# Patient Record
Sex: FEMALE | Race: ASIAN | NOT HISPANIC OR LATINO | URBAN - METROPOLITAN AREA
[De-identification: names, ages, dates, MRNs, and addresses within clinical notes are randomized per-mention and may not be internally consistent; named-entity substitution may affect disease eponyms.]

---

## 2017-02-01 ENCOUNTER — OUTPATIENT (OUTPATIENT)
Dept: OUTPATIENT SERVICES | Facility: HOSPITAL | Age: 67
LOS: 1 days | End: 2017-02-01
Payer: MEDICAID

## 2017-02-13 DIAGNOSIS — R69 ILLNESS, UNSPECIFIED: ICD-10-CM

## 2017-09-01 PROCEDURE — G9001: CPT

## 2018-05-27 ENCOUNTER — EMERGENCY (EMERGENCY)
Facility: HOSPITAL | Age: 68
LOS: 1 days | Discharge: HOME | End: 2018-05-27
Attending: EMERGENCY MEDICINE
Payer: MEDICARE

## 2018-05-27 VITALS
OXYGEN SATURATION: 96 % | DIASTOLIC BLOOD PRESSURE: 85 MMHG | HEART RATE: 70 BPM | RESPIRATION RATE: 16 BRPM | SYSTOLIC BLOOD PRESSURE: 151 MMHG | TEMPERATURE: 97 F

## 2018-05-27 PROCEDURE — 99283 EMERGENCY DEPT VISIT LOW MDM: CPT

## 2018-05-27 RX ORDER — LIDOCAINE 4 G/100G
1 CREAM TOPICAL ONCE
Qty: 0 | Refills: 0 | Status: COMPLETED | OUTPATIENT
Start: 2018-05-27 | End: 2018-05-27

## 2018-05-27 RX ORDER — IBUPROFEN 200 MG
400 TABLET ORAL ONCE
Qty: 0 | Refills: 0 | Status: COMPLETED | OUTPATIENT
Start: 2018-05-27 | End: 2018-05-27

## 2018-05-27 RX ORDER — TRAMADOL HYDROCHLORIDE 50 MG/1
1 TABLET ORAL
Qty: 12 | Refills: 0 | OUTPATIENT
Start: 2018-05-27 | End: 2018-05-30

## 2018-05-27 RX ORDER — ACETAMINOPHEN 500 MG
650 TABLET ORAL ONCE
Qty: 0 | Refills: 0 | Status: COMPLETED | OUTPATIENT
Start: 2018-05-27 | End: 2018-05-27

## 2018-05-27 RX ORDER — TRAMADOL HYDROCHLORIDE 50 MG/1
50 TABLET ORAL ONCE
Qty: 0 | Refills: 0 | Status: DISCONTINUED | OUTPATIENT
Start: 2018-05-27 | End: 2018-05-27

## 2018-05-27 RX ADMIN — Medication 400 MILLIGRAM(S): at 14:57

## 2018-05-27 RX ADMIN — TRAMADOL HYDROCHLORIDE 50 MILLIGRAM(S): 50 TABLET ORAL at 14:56

## 2018-05-27 RX ADMIN — Medication 650 MILLIGRAM(S): at 14:58

## 2018-05-27 RX ADMIN — LIDOCAINE 1 PATCH: 4 CREAM TOPICAL at 14:57

## 2018-05-27 NOTE — ED PROVIDER NOTE - PHYSICAL EXAMINATION
NAD, VSS, Afebrile, No spinal mid tender, + Low lumbar right sided para lumbar tender with sciatica tender, No CVA tender, Full ROMs of hips, No focal neuro deficit.

## 2018-05-27 NOTE — ED PROVIDER NOTE - ATTENDING CONTRIBUTION TO CARE
68F hx htn dm hld hypothyroidism presents to the ED with pain radiating down her right leg. Pt states that she fell in the winter -10 days later started having these same types of symptoms - had xray at that time which was negative. went to chiroprator and accupuncture and it seemed to improve. Now over the past week pt has had pain again radiating down her right leg. No f/c/cp/sob/ha/dizziness/abd pain/n/v. No back pain. no saddle anesthesia. no urinary incontinence, ambulating.On exam clear lungs rrr abd soft non-tender no cvat. + SLR on right. no saddle anesthesia normal strength and sensation. likely sciatica. no new injuries. no midline spinal tenderness. Will pain control and have pt follow up pmd for pt.     Constitutional: No fever or chills  Eyes: No visual changes  HEENT: No throat pain  CV: No chest pain  Resp: No SOB no cough  GI: No abd pain, nausea or vomiting  : No dysuria  MSK: right leg pain  Skin: No rash  Neuro: No headache     Constitutional: mild distress AAOx3  Eyes: PERRLA EOMI  Head: Normocephalic atraumatic  Mouth: MMM  Cardiac: regular rate  normal peripheral pulses all 4 extremities  Resp: Lungs CTAB  GI: Abd s/nt/nd  Neuro: CN2-12 intact no saddle anesthesia normal strength and sensation  Skin: No rashes   msk: no midline spinal tenderness to palpation,. + SLR on right. no bony ttp.   Al Ag M.D., Attending Physician

## 2018-05-27 NOTE — ED PROVIDER NOTE - MEDICAL DECISION MAKING DETAILS
68F hx htn dm hld hypothyroidism presents to the ED with pain radiating down her right leg. Pt states that she fell in the winter -10 days later started having these same types of symptoms - had xray at that time which was negative. went to chiroprator and accupuncture and it seemed to improve. Now over the past week pt has had pain again radiating down her right leg. No f/c/cp/sob/ha/dizziness/abd pain/n/v. No back pain. no saddle anesthesia. no urinary incontinence, ambulating.On exam clear lungs rrr abd soft non-tender no cvat. + SLR on right. no saddle anesthesia normal strength and sensation. likely sciatica. no new injuries. no midline spinal tenderness. Will pain control and have pt follow up pmd for pt. Al Ag M.D., Attending Physician

## 2018-05-27 NOTE — ED PROVIDER NOTE - OBJECTIVE STATEMENT
69yo female pt with PMHx of HTN, DM, HLD, Thyroidism c/o worsening low back pain, radiating to right leg today. Pt stated she had intermittent low back pain with radiating leg pain since /2018 and evaluated by PMD (no Fx in x-ray, on Meloxicam). She had PT and Acupuncture Tx 2x/week with some relief but the pain's worsen today. Denies recent injury or fall. Denies fever, chills or recent sickness. Denies CP/SOB/ABD pain or N/V. Denies urinary or bowel problems. Denies sensory changes or weakness to extremities.

## 2018-05-27 NOTE — ED ADULT NURSE NOTE - OBJECTIVE STATEMENT
Patient presents to ed with c/o back pain. Patient reports she has been experiencing intermittent back pain  and today pain is worse and radiates down her rt leg. Patient denies injury, no chest pain sob nausea vomiting  urinary symptoms fever or chills.

## 2024-02-17 NOTE — ED PROVIDER NOTE - CONSTITUTIONAL NEGATIVE STATEMENT, MLM
Zyrtec D - Take as directed for sinus congestion  Flonase - Nasal spray to treat nasal congestion. Blow nose completely and then administer one spray per nostril daily.  Mucinex - Take as directed to help loosen secretions.   Tylenol/Ibuprofen - Can take as directed for fevers, body aches, or other pain.  Tessalon Perles - Take three times a day as needed for cough.  Stay hydrated. Get plenty of rest.   We will call you only with positive test results.   
no fever and no chills.